# Patient Record
Sex: FEMALE | Race: WHITE | Employment: UNEMPLOYED | URBAN - METROPOLITAN AREA
[De-identification: names, ages, dates, MRNs, and addresses within clinical notes are randomized per-mention and may not be internally consistent; named-entity substitution may affect disease eponyms.]

---

## 2018-03-24 ENCOUNTER — APPOINTMENT (OUTPATIENT)
Dept: GENERAL RADIOLOGY | Age: 78
End: 2018-03-24
Payer: COMMERCIAL

## 2018-03-24 ENCOUNTER — HOSPITAL ENCOUNTER (EMERGENCY)
Age: 78
Discharge: HOME | End: 2018-03-24
Payer: COMMERCIAL

## 2018-03-24 VITALS
HEART RATE: 75 BPM | DIASTOLIC BLOOD PRESSURE: 67 MMHG | RESPIRATION RATE: 20 BRPM | WEIGHT: 200 LBS | OXYGEN SATURATION: 100 % | TEMPERATURE: 97.9 F | SYSTOLIC BLOOD PRESSURE: 143 MMHG | HEIGHT: 65 IN | BODY MASS INDEX: 33.32 KG/M2
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
## 2018-03-24 DIAGNOSIS — S70.01XA CONTUSION OF RIGHT HIP, INITIAL ENCOUNTER: ICD-10-CM

## 2018-03-24 PROCEDURE — 99284 EMERGENCY DEPT VISIT MOD MDM: CPT

## 2018-03-24 PROCEDURE — 73502 X-RAY EXAM HIP UNI 2-3 VIEWS: CPT

## 2018-03-24 RX ORDER — VERAPAMIL HYDROCHLORIDE 120 MG/1
120 CAPSULE, EXTENDED RELEASE ORAL NIGHTLY
COMMUNITY

## 2018-03-24 ASSESSMENT — PAIN SCALES - GENERAL: PAINLEVEL_OUTOF10: 8

## 2018-03-24 ASSESSMENT — PAIN DESCRIPTION - FREQUENCY: FREQUENCY: CONTINUOUS

## 2018-03-24 ASSESSMENT — PAIN DESCRIPTION - DESCRIPTORS: DESCRIPTORS: ACHING

## 2018-03-24 ASSESSMENT — PAIN DESCRIPTION - ORIENTATION: ORIENTATION: RIGHT

## 2018-03-24 ASSESSMENT — PAIN DESCRIPTION - LOCATION: LOCATION: FOOT;HIP

## 2018-03-24 ASSESSMENT — PAIN DESCRIPTION - PAIN TYPE: TYPE: ACUTE PAIN

## 2018-03-24 NOTE — ED PROVIDER NOTES
Medication List as of 3/24/2018  5:11 PM        Electronically signed by REBECCA Hitchcock   DD: 3/24/18  **This report was transcribed using voice recognition software. Every effort was made to ensure accuracy; however, inadvertent computerized transcription errors may be present.   END OF ED PROVIDER NOTE       REBECCA Pfeiffer  03/24/18 [id number]

## 2025-06-05 ENCOUNTER — HOSPITAL ENCOUNTER (EMERGENCY)
Age: 85
Discharge: HOME | End: 2025-06-05
Payer: MEDICARE

## 2025-06-05 VITALS
SYSTOLIC BLOOD PRESSURE: 141 MMHG | RESPIRATION RATE: 20 BRPM | DIASTOLIC BLOOD PRESSURE: 55 MMHG | HEART RATE: 81 BPM | TEMPERATURE: 98.5 F | OXYGEN SATURATION: 96 %

## 2025-06-05 DIAGNOSIS — H92.02 OTALGIA OF LEFT EAR: ICD-10-CM

## 2025-06-05 DIAGNOSIS — J02.9 ACUTE PHARYNGITIS, UNSPECIFIED ETIOLOGY: Primary | ICD-10-CM

## 2025-06-05 PROCEDURE — 99283 EMERGENCY DEPT VISIT LOW MDM: CPT

## 2025-06-05 RX ORDER — FLUTICASONE PROPIONATE 50 MCG
1 SPRAY, SUSPENSION (ML) NASAL DAILY PRN
Qty: 32 G | Refills: 0 | Status: SHIPPED | OUTPATIENT
Start: 2025-06-05

## 2025-06-05 RX ORDER — NEOMYCIN SULFATE, POLYMYXIN B SULFATE AND HYDROCORTISONE 3.5; 10000; 1 MG/ML; [IU]/ML; MG/ML
4 SOLUTION AURICULAR (OTIC) 3 TIMES DAILY
Qty: 10 ML | Refills: 0 | Status: SHIPPED | OUTPATIENT
Start: 2025-06-05 | End: 2025-06-15

## 2025-06-05 RX ORDER — AMOXICILLIN 500 MG/1
500 CAPSULE ORAL 2 TIMES DAILY
Qty: 14 CAPSULE | Refills: 0 | Status: SHIPPED | OUTPATIENT
Start: 2025-06-05 | End: 2025-06-12

## 2025-06-05 ASSESSMENT — LIFESTYLE VARIABLES
HOW MANY STANDARD DRINKS CONTAINING ALCOHOL DO YOU HAVE ON A TYPICAL DAY: 1 OR 2
HOW OFTEN DO YOU HAVE A DRINK CONTAINING ALCOHOL: MONTHLY OR LESS

## 2025-06-05 ASSESSMENT — PAIN - FUNCTIONAL ASSESSMENT
PAIN_FUNCTIONAL_ASSESSMENT: 0-10
PAIN_FUNCTIONAL_ASSESSMENT: NONE - DENIES PAIN

## 2025-06-05 ASSESSMENT — PAIN DESCRIPTION - ORIENTATION: ORIENTATION: RIGHT;LEFT

## 2025-06-05 ASSESSMENT — PAIN SCALES - GENERAL
PAINLEVEL_OUTOF10: 0
PAINLEVEL_OUTOF10: 4

## 2025-06-05 ASSESSMENT — PAIN DESCRIPTION - LOCATION: LOCATION: EAR

## 2025-06-05 ASSESSMENT — PAIN DESCRIPTION - DESCRIPTORS: DESCRIPTORS: ACHING;DISCOMFORT

## 2025-06-05 NOTE — ED PROVIDER NOTES
Independent JENNI Visit.          [facility] EMERGENCY DEPARTMENT  EMERGENCY DEPARTMENT ENCOUNTER        Pt Name: Bri Agee  MRN: [id number]  Birthdate 1940  Date of evaluation: 6/5/2025  Provider: BENI Dos Santos - CNP  PCP: Lionel Spaulding DO      CHIEF COMPLAINT       Chief Complaint   Patient presents with    Pharyngitis     Since Monday    Ear Pain     Bilateral Ear pain onset today. Left Ear worse.    Cough     Chest congestion       HISTORY OF PRESENT ILLNESS: 1 or more Elements   Bri Agee is a 84 y.o. female with a past medical history of  has a past medical history of Hypertension. presenting with complaints of vague uri symptoms including left otalgia, post nasal drainage, nasal congestion, pharyngitis, sneezing, itchy throat/nose, dry cough. Reports symptoms began gradually four days ago. The history is obtained from the patient as well as the patients medical record.  She thought she had some allergy type of symptoms versus dry mucus membranes. She has been using netti pot and gargling salt water for her symptoms which seems to help. Patient denies excessive fatigue or sleeping greater than 18 hours a day. The patient denies any myalgias, night sweats, abdominal pain, nausea, vomiting, diarrhea, left upper quadrant fullness, or early satiety. The patient also denies difficulty breathing, hemoptysis, neck pain/stiffness, blurry vision, fever, chills, syncope, lightheadedness, dizziness, wheezing, shortness of breath or chest pain. Denies ageusia or anosmia. Patient reports eating & drinking well. Denies recent illness, antibiotic use, or hospitalizations. Medications tried prior to arrival: none.  Exposure to sick contacts: none. Denies additional complaints currently.       Nursing Notes were all reviewed and agreed with or any disagreements were addressed in the HPI.      REVIEW OF EXTERNAL NOTE :         REVIEW OF SYSTEMS :           Positives and Pertinent  uvular deviation or edema. No tonsillary asymmetry.  Floor of the mouth soft, no trismus, handling secretions. Clear post nasal drainage in posterior pharynx noted.  Neck: Normal range of motion is achieved in the neck. There is no JVD present. No meningeal signs are present. No cervical adenopathy.  Respiratory: The chest is nontender. Lungs are clear to auscultation bilaterally. No wheezes, rales, or rhonchi. There is no respiratory distress. Respirations easy and unlabored. No audible cough or wheezing. No stridor.  Cardiovascular: S1S2, RRR, without murmurs, rubs, clicks or gallops.  Abdominal exam: The abdomen is non tender without evidence of peritoneal signs. Specific attention to the left upper quadrant with palpation of the spleen demonstrates no organomegaly or tenderness. Non distended. BS x 4 quadrants. No palpable masses.  Musculoskeletal: Moves all extremities x 4. Warm and well perfused. No joint swelling.  Skin: warm and dry, without rash. No lesions or open wounds. No cyanosis, jaundice or ecchymosis noted.  Neurologic: GCS 15, no tremor, no focal deficits  Psych: Normal Affect        DIAGNOSTIC RESULTS   LABS:    Labs Reviewed - No data to display    As interpreted by me, the above displayed labs are abnormal. All other labs obtained during this visit were within normal range or not returned as of this dictation.          RADIOLOGY:   images such as Xray, CT, Ultrasound and MRI are read by the radiologist.    Interpretation per the Radiologist below, if available at the time of this note:    No orders to display     No results found.    No results found.    PROCEDURES   Unless otherwise noted below, none          CRITICAL CARE TIME (.cct)   N/A    PAST MEDICAL HISTORY/Chronic Conditions Affecting Care      has a past medical history of Hypertension.     EMERGENCY DEPARTMENT COURSE    Vitals:    Vitals:    06/05/25 1248   BP: (!) 141/55   Pulse: 81   Resp: 20   Temp: 98.5 °F (36.9 °C)   TempSrc: